# Patient Record
Sex: FEMALE
[De-identification: names, ages, dates, MRNs, and addresses within clinical notes are randomized per-mention and may not be internally consistent; named-entity substitution may affect disease eponyms.]

---

## 2022-06-28 ENCOUNTER — DASHBOARD ENCOUNTERS (OUTPATIENT)
Age: 47
End: 2022-06-28

## 2022-06-28 ENCOUNTER — OFFICE VISIT (OUTPATIENT)
Dept: URBAN - METROPOLITAN AREA CLINIC 96 | Facility: CLINIC | Age: 47
End: 2022-06-28
Payer: MEDICAID

## 2022-06-28 ENCOUNTER — WEB ENCOUNTER (OUTPATIENT)
Dept: URBAN - METROPOLITAN AREA CLINIC 96 | Facility: CLINIC | Age: 47
End: 2022-06-28

## 2022-06-28 VITALS
HEIGHT: 62 IN | SYSTOLIC BLOOD PRESSURE: 148 MMHG | TEMPERATURE: 98 F | WEIGHT: 131 LBS | HEART RATE: 75 BPM | BODY MASS INDEX: 24.11 KG/M2 | DIASTOLIC BLOOD PRESSURE: 98 MMHG

## 2022-06-28 DIAGNOSIS — B19.10 HEPATITIS B INFECTION WITHOUT DELTA AGENT WITHOUT HEPATIC COMA, UNSPECIFIED CHRONICITY: ICD-10-CM

## 2022-06-28 DIAGNOSIS — R10.13 EPIGASTRIC PAIN: ICD-10-CM

## 2022-06-28 DIAGNOSIS — F10.10 ALCOHOL ABUSE: ICD-10-CM

## 2022-06-28 DIAGNOSIS — B18.2 CHRONIC HEPATITIS C WITHOUT HEPATIC COMA: ICD-10-CM

## 2022-06-28 DIAGNOSIS — K92.1 MELENA: ICD-10-CM

## 2022-06-28 DIAGNOSIS — K59.00 CONSTIPATION, UNSPECIFIED CONSTIPATION TYPE: ICD-10-CM

## 2022-06-28 DIAGNOSIS — R14.0 BLOATING: ICD-10-CM

## 2022-06-28 DIAGNOSIS — R11.0 NAUSEA: ICD-10-CM

## 2022-06-28 PROBLEM — 111891008: Status: ACTIVE | Noted: 2022-06-28

## 2022-06-28 PROBLEM — 14760008: Status: ACTIVE | Noted: 2022-06-28

## 2022-06-28 PROBLEM — 128302006: Status: ACTIVE | Noted: 2022-06-28

## 2022-06-28 PROCEDURE — 99205 OFFICE O/P NEW HI 60 MIN: CPT | Performed by: INTERNAL MEDICINE

## 2022-06-28 RX ORDER — GABAPENTIN 100 MG/1
1 CAPSULE CAPSULE ORAL ONCE A DAY
Status: ACTIVE | COMMUNITY

## 2022-06-28 RX ORDER — CLONIDINE HYDROCHLORIDE 0.1 MG/1
1 TABLET TABLET ORAL ONCE A DAY
Status: ACTIVE | COMMUNITY

## 2022-06-28 RX ORDER — QUETIAPINE 200 MG/1
1 TABLET AT BEDTIME TABLET, FILM COATED ORAL ONCE A DAY
Status: ACTIVE | COMMUNITY

## 2022-06-28 NOTE — HPI-TODAY'S VISIT:
Patient presents self referred. Poor historian. Patient reports she is currently at inpatient rehab for alcohol and IVDA for the past 12 weeks. Due to complete in 8/2022.    Reports having epigastric pain for the past 3-4 months. Nausea with no emesis. Occasional melena, no rectal bleeding. No unintentional weight loss.   No dysphagia, no odynophagia.   Patient reports prior hx of PUD remotely diagnosed via EGD per patient. Perhaps done 6-7 years ago.   Does report chronic alcohol use for over 20 years.  No cirrhosis known. No imaging recently per patient.   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 Did not follow up. Reports recent hepatitis testing was positive, cannot recall for what. Patient arrives with no labs.   Denies any icterus. No jaundice.   Memory issues.  Constipation issues, BM can be daily, but can skip days. Not tried prior laxative for such. No family hx of colon cancer. Remote colonoscopy 6-7 years ago normal per patient.

## 2022-06-28 NOTE — PHYSICAL EXAM CONSTITUTIONAL:
well developed,, very poor historian, in no acute distress , ambulating without difficulty , flight of ideas with pressured speech,  poor historian, tattoos on hands, Denies SI or HI

## 2022-07-05 LAB
ALBUMIN: 4.9
ALKALINE PHOSPHATASE: 64
ALT (SGPT): 11
AST (SGOT): 17
BILIRUBIN, DIRECT: <0.1
BILIRUBIN, TOTAL: 0.2
HBSAG SCREEN: NEGATIVE
HCV AB: 5.3
HCV LOG10: (no result)
HEMATOCRIT: 39.3
HEMOGLOBIN: 13.2
HEP A AB, IGM: NEGATIVE
HEP B CORE AB, IGM: NEGATIVE
HEPATITIS C QUANTITATION: (no result)
INR: 0.9
INTERPRETATION:: (no result)
MCH: 30.8
MCHC: 33.6
MCV: 92
NRBC: (no result)
PLATELETS: 256
PROTEIN, TOTAL: 7
PROTHROMBIN TIME: 10
RBC: 4.28
RDW: 12.6
TEST INFORMATION:: (no result)
WBC: 6.3

## 2022-07-06 PROBLEM — 15167005: Status: ACTIVE | Noted: 2022-06-28

## 2022-07-06 PROBLEM — 128241005: Status: ACTIVE | Noted: 2022-06-28

## 2022-07-11 ENCOUNTER — LAB OUTSIDE AN ENCOUNTER (OUTPATIENT)
Dept: URBAN - METROPOLITAN AREA CLINIC 96 | Facility: CLINIC | Age: 47
End: 2022-07-11

## 2022-08-08 ENCOUNTER — OFFICE VISIT (OUTPATIENT)
Dept: URBAN - METROPOLITAN AREA MEDICAL CENTER 28 | Facility: MEDICAL CENTER | Age: 47
End: 2022-08-08
Payer: MEDICAID

## 2022-08-08 DIAGNOSIS — K92.1 ACUTE MELENA: ICD-10-CM

## 2022-08-08 DIAGNOSIS — R10.13 ABDOMINAL DISCOMFORT, EPIGASTRIC: ICD-10-CM

## 2022-08-08 DIAGNOSIS — K29.50 ANTRAL GASTRITIS: ICD-10-CM

## 2022-08-08 DIAGNOSIS — K31.89 ACQUIRED DEFORMITY OF DUODENUM: ICD-10-CM

## 2022-08-08 PROCEDURE — 43239 EGD BIOPSY SINGLE/MULTIPLE: CPT | Performed by: INTERNAL MEDICINE

## 2022-08-08 RX ORDER — QUETIAPINE 200 MG/1
1 TABLET AT BEDTIME TABLET, FILM COATED ORAL ONCE A DAY
Status: ACTIVE | COMMUNITY

## 2022-08-08 RX ORDER — CLONIDINE HYDROCHLORIDE 0.1 MG/1
1 TABLET TABLET ORAL ONCE A DAY
Status: ACTIVE | COMMUNITY

## 2022-08-08 RX ORDER — GABAPENTIN 100 MG/1
1 CAPSULE CAPSULE ORAL ONCE A DAY
Status: ACTIVE | COMMUNITY